# Patient Record
Sex: MALE | Race: OTHER | Employment: OTHER | URBAN - METROPOLITAN AREA
[De-identification: names, ages, dates, MRNs, and addresses within clinical notes are randomized per-mention and may not be internally consistent; named-entity substitution may affect disease eponyms.]

---

## 2022-05-19 ENCOUNTER — CONSULTATION/EVALUATION (OUTPATIENT)
Dept: URBAN - METROPOLITAN AREA CLINIC 39 | Facility: CLINIC | Age: 47
End: 2022-05-19

## 2022-05-19 DIAGNOSIS — H04.123: ICD-10-CM

## 2022-05-19 DIAGNOSIS — H01.003: ICD-10-CM

## 2022-05-19 DIAGNOSIS — H01.006: ICD-10-CM

## 2022-05-19 DIAGNOSIS — H52.7: ICD-10-CM

## 2022-05-19 PROCEDURE — 92136TC INTERFEROMETRY - TECHNICAL COMPONENT

## 2022-05-19 PROCEDURE — 92250 FUNDUS PHOTOGRAPHY W/I&R: CPT

## 2022-05-19 PROCEDURE — 99499RLE REFRACTIVE CONSULT/RLE

## 2022-05-19 PROCEDURE — 92025 CPTRIZED CORNEAL TOPOGRAPHY: CPT

## 2022-05-19 ASSESSMENT — VISUAL ACUITY
OD_BAT: 20/25+1
OD_SC: J10
OD_CC: 20/25+2
OS_SC: J12
OS_SC: 20/200
OD_CC: J2
OS_BAT: 20/25
OS_CC: 20/25
OS_CC: J1
OD_SC: 20/100

## 2022-05-19 ASSESSMENT — TONOMETRY
OS_IOP_MMHG: 14
OD_IOP_MMHG: 14

## 2022-05-19 NOTE — PATIENT DISCUSSION
Recommend lasik MV or RLE CV with MV. Pt will need a contact monovision lens trial with an OD to determine if pt is a candidate for MV. Jewels OD, and OS target -1.50.

## 2022-05-19 NOTE — PATIENT DISCUSSION
***[facility] please notify Andrew Donnelly of pt elects ADV RLE, she needs to order symfony lens***.

## 2022-05-19 NOTE — PATIENT DISCUSSION
Discussed the options of lasik Vs RLE. Considering that the patient has early cataracts discussed to pt that the lasik wont last forever and that RLE may be the better choice to prolong time without glasses.

## 2022-06-09 ENCOUNTER — CONTACT LENSES/GLASSES VISIT (OUTPATIENT)
Dept: URBAN - METROPOLITAN AREA CLINIC 39 | Facility: CLINIC | Age: 47
End: 2022-06-09

## 2022-06-09 DIAGNOSIS — H52.7: ICD-10-CM

## 2022-06-09 DIAGNOSIS — H01.003: ICD-10-CM

## 2022-06-09 DIAGNOSIS — H04.123: ICD-10-CM

## 2022-06-09 DIAGNOSIS — H01.006: ICD-10-CM

## 2022-06-09 PROCEDURE — 92310L CL MGMNT PRE-LASIK TRIAL

## 2022-06-09 ASSESSMENT — VISUAL ACUITY
OD_CC: 20/20-1
OS_CC: 20/70-1
OS_PH: 20/30-1
OS_CC: J1+
OD_CC: J10

## 2022-06-13 ENCOUNTER — CONTACT LENSES/GLASSES VISIT (OUTPATIENT)
Dept: URBAN - METROPOLITAN AREA CLINIC 39 | Facility: CLINIC | Age: 47
End: 2022-06-13

## 2022-06-13 DIAGNOSIS — H52.7: ICD-10-CM

## 2022-06-13 PROCEDURE — 92310F

## 2022-06-13 ASSESSMENT — VISUAL ACUITY
OS_CC: J2
OD_CC: 20/25-1
OU_CC: 20/25+2
OU_CC: J1
OS_CC: 20/100
OD_CC: J5

## 2022-06-13 NOTE — PATIENT DISCUSSION
6/13/22 JRL : Patient is adapting to the monovision contacts but ultimately feels he will be better served by ADV RLE vs monovison lasik. Patient would like to move forward on RLE OU.

## 2022-07-15 ENCOUNTER — SURGERY/PROCEDURE (OUTPATIENT)
Dept: URBAN - METROPOLITAN AREA CLINIC 39 | Facility: CLINIC | Age: 47
End: 2022-07-15

## 2022-07-15 ENCOUNTER — POST-OP (OUTPATIENT)
Dept: URBAN - METROPOLITAN AREA CLINIC 39 | Facility: CLINIC | Age: 47
End: 2022-07-15

## 2022-07-15 ENCOUNTER — PRE-OP/H&P (OUTPATIENT)
Dept: URBAN - METROPOLITAN AREA SURGERY 14 | Facility: SURGERY | Age: 47
End: 2022-07-15

## 2022-07-15 DIAGNOSIS — H52.7: ICD-10-CM

## 2022-07-15 DIAGNOSIS — Z96.1: ICD-10-CM

## 2022-07-15 PROCEDURE — 66999RAV RLE WITH ADVANCED LENS

## 2022-07-15 PROCEDURE — 99211HP H&P OFFICE/OUTPATIENT VISIT, EST

## 2022-07-15 PROCEDURE — 99211T TECH SERVICE

## 2022-07-15 PROCEDURE — 66999LNSR LENSAR LASER FOR CAT SX

## 2022-07-15 ASSESSMENT — VISUAL ACUITY: OD_SC: 20/25

## 2022-07-15 ASSESSMENT — TONOMETRY: OD_IOP_MMHG: 21

## 2022-07-18 ENCOUNTER — POST-OP (OUTPATIENT)
Dept: URBAN - METROPOLITAN AREA CLINIC 43 | Facility: CLINIC | Age: 47
End: 2022-07-18

## 2022-07-18 DIAGNOSIS — Z96.1: ICD-10-CM

## 2022-07-18 PROCEDURE — 99024 POSTOP FOLLOW-UP VISIT: CPT

## 2022-07-18 ASSESSMENT — VISUAL ACUITY
OS_SC: 20/200
OD_SC: J3
OD_SC: 20/20-2
OS_SC: J12
OS_PH: 20/40-2

## 2022-07-18 ASSESSMENT — TONOMETRY
OD_IOP_MMHG: 15
OS_IOP_MMHG: 14

## 2022-07-21 ENCOUNTER — POST OP/EVAL OF SECOND EYE (OUTPATIENT)
Dept: URBAN - METROPOLITAN AREA CLINIC 39 | Facility: CLINIC | Age: 47
End: 2022-07-21

## 2022-07-21 DIAGNOSIS — H52.7: ICD-10-CM

## 2022-07-21 DIAGNOSIS — Z96.1: ICD-10-CM

## 2022-07-21 PROCEDURE — 99024 POSTOP FOLLOW-UP VISIT: CPT

## 2022-07-21 ASSESSMENT — VISUAL ACUITY
OS_SC: J12
OD_SC: 20/20
OS_SC: 20/400
OD_SC: J3
OU_SC: 20/20
OS_PH: 20/40

## 2022-07-21 ASSESSMENT — TONOMETRY
OS_IOP_MMHG: 15
OD_IOP_MMHG: 15

## 2022-07-22 ENCOUNTER — PRE-OP/H&P (OUTPATIENT)
Dept: URBAN - METROPOLITAN AREA SURGERY 14 | Facility: SURGERY | Age: 47
End: 2022-07-22

## 2022-07-22 ENCOUNTER — SURGERY/PROCEDURE (OUTPATIENT)
Dept: URBAN - METROPOLITAN AREA CLINIC 39 | Facility: CLINIC | Age: 47
End: 2022-07-22

## 2022-07-22 ENCOUNTER — POST-OP (OUTPATIENT)
Dept: URBAN - METROPOLITAN AREA SURGERY 14 | Facility: SURGERY | Age: 47
End: 2022-07-22

## 2022-07-22 DIAGNOSIS — H52.7: ICD-10-CM

## 2022-07-22 DIAGNOSIS — Z96.1: ICD-10-CM

## 2022-07-22 PROCEDURE — 99211HP H&P OFFICE/OUTPATIENT VISIT, EST

## 2022-07-22 PROCEDURE — 99211T TECH SERVICE

## 2022-07-22 PROCEDURE — 66999RAV RLE WITH ADVANCED LENS

## 2022-07-22 PROCEDURE — 66999LNSR LENSAR LASER FOR CAT SX

## 2022-07-22 ASSESSMENT — VISUAL ACUITY: OS_SC: 20/20

## 2022-07-22 ASSESSMENT — TONOMETRY: OS_IOP_MMHG: 7

## 2022-07-25 ENCOUNTER — POST-OP (OUTPATIENT)
Dept: URBAN - METROPOLITAN AREA CLINIC 43 | Facility: CLINIC | Age: 47
End: 2022-07-25

## 2022-07-25 DIAGNOSIS — Z96.1: ICD-10-CM

## 2022-07-25 PROCEDURE — 99024 POSTOP FOLLOW-UP VISIT: CPT

## 2022-07-25 ASSESSMENT — TONOMETRY
OS_IOP_MMHG: 17
OD_IOP_MMHG: 15

## 2022-07-25 ASSESSMENT — VISUAL ACUITY
OD_SC: J2
OU_SC: 20/20-1
OS_SC: 20/25-1
OS_SC: J1
OU_SC: J1
OD_SC: 20/20-1

## 2022-08-18 ENCOUNTER — POST-OP (OUTPATIENT)
Dept: URBAN - METROPOLITAN AREA CLINIC 39 | Facility: CLINIC | Age: 47
End: 2022-08-18

## 2022-08-18 DIAGNOSIS — Z96.1: ICD-10-CM

## 2022-08-18 PROCEDURE — 99024 POSTOP FOLLOW-UP VISIT: CPT

## 2022-08-18 ASSESSMENT — VISUAL ACUITY
OD_SC: 20/15
OS_SC: J1
OD_SC: J3
OS_SC: 20/15-1

## 2022-08-18 ASSESSMENT — TONOMETRY
OS_IOP_MMHG: 16
OD_IOP_MMHG: 15

## 2022-12-12 ENCOUNTER — POST-OP (OUTPATIENT)
Dept: URBAN - METROPOLITAN AREA CLINIC 39 | Facility: CLINIC | Age: 47
End: 2022-12-12

## 2022-12-12 PROCEDURE — 99024 POSTOP FOLLOW-UP VISIT: CPT

## 2022-12-12 ASSESSMENT — VISUAL ACUITY
OD_SC: 20/15-2
OS_SC: 20/25-1
OS_SC: J1
OD_SC: J3

## 2022-12-12 ASSESSMENT — TONOMETRY
OD_IOP_MMHG: 16
OS_IOP_MMHG: 16
